# Patient Record
Sex: MALE | Race: WHITE
[De-identification: names, ages, dates, MRNs, and addresses within clinical notes are randomized per-mention and may not be internally consistent; named-entity substitution may affect disease eponyms.]

---

## 2022-12-07 ENCOUNTER — NON-APPOINTMENT (OUTPATIENT)
Age: 72
End: 2022-12-07

## 2022-12-07 PROBLEM — Z00.00 ENCOUNTER FOR PREVENTIVE HEALTH EXAMINATION: Status: ACTIVE | Noted: 2022-12-07

## 2022-12-08 ENCOUNTER — APPOINTMENT (OUTPATIENT)
Dept: UROLOGY | Facility: CLINIC | Age: 72
End: 2022-12-08

## 2022-12-08 VITALS
HEIGHT: 72 IN | BODY MASS INDEX: 24.65 KG/M2 | HEART RATE: 53 BPM | DIASTOLIC BLOOD PRESSURE: 80 MMHG | SYSTOLIC BLOOD PRESSURE: 131 MMHG | TEMPERATURE: 97.6 F | WEIGHT: 182 LBS

## 2022-12-08 PROCEDURE — 99204 OFFICE O/P NEW MOD 45 MIN: CPT

## 2022-12-08 RX ORDER — SILDENAFIL 100 MG/1
100 TABLET, FILM COATED ORAL
Qty: 10 | Refills: 11 | Status: ACTIVE | COMMUNITY
Start: 2022-12-08 | End: 1900-01-01

## 2022-12-08 NOTE — LETTER BODY
[Dear  ___] : Dear  [unfilled], [FreeTextEntry2] : Boy Zambrano MD\par [address]\[place] [address] [FreeTextEntry1] : I had the pleasure of seeing your patient,  BRONSON ANDRADE, a 72 year old man, in the office in consultation today. Please see the attached note for full details.\par \par Thank you very much for allowing me to participate in the care of this patient. If you have any questions please feel free to call me at any time. \par \par \par Sincerely yours,\par \par \par \par Joseph Boston MD, JESICA\par Director, Male Fertility and Microsurgery\par  of Urology\par [facility]\par

## 2022-12-08 NOTE — HISTORY OF PRESENT ILLNESS
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, previous urologist, retired\par treated for 4 years with sildenafil 20mg x1, increased to 40mg recently\par Tried tadalafil in the past, worked but advised to switch to sildenafil for lower effective dose\par In the last 3 months, ED has worsened, difficulty maintaining erections and with penetration \par Erections 4-7/10\par Normal desire\par No previous ICI\par No new medications\par No new identifiable stressors\par No painful erections or penile curvature\par PSA 0.7 7/2022\par \par PMH: spinal cord compression 2015 s/p correction\par PSH: knee surgeries\par Meds: statin (started 2 weeks ago)\par NKDA\par Social: , partially retired, 1-2 glasses wine/day

## 2022-12-08 NOTE — PHYSICAL EXAM
[General Appearance - Well Developed] : well developed [Normal Appearance] : normal appearance [] : no respiratory distress [Exaggerated Use Of Accessory Muscles For Inspiration] : no accessory muscle use [Normal Station and Gait] : the gait and station were normal for the patient's age [Oriented To Time, Place, And Person] : oriented to person, place, and time [Not Anxious] : not anxious [Urethral Meatus] : meatus normal [Penis Abnormality] : normal circumcised penis [FreeTextEntry1] : no penile plaques or TTP

## 2023-06-09 ENCOUNTER — APPOINTMENT (OUTPATIENT)
Dept: UROLOGY | Facility: CLINIC | Age: 73
End: 2023-06-09
Payer: MEDICARE

## 2023-06-09 VITALS
HEIGHT: 72 IN | SYSTOLIC BLOOD PRESSURE: 118 MMHG | TEMPERATURE: 97.6 F | BODY MASS INDEX: 24.65 KG/M2 | WEIGHT: 182 LBS | DIASTOLIC BLOOD PRESSURE: 68 MMHG | HEART RATE: 60 BPM

## 2023-06-09 DIAGNOSIS — N52.9 MALE ERECTILE DYSFUNCTION, UNSPECIFIED: ICD-10-CM

## 2023-06-09 PROCEDURE — 99213 OFFICE O/P EST LOW 20 MIN: CPT

## 2023-06-09 RX ORDER — SILDENAFIL 100 MG/1
100 TABLET, FILM COATED ORAL
Qty: 30 | Refills: 11 | Status: ACTIVE | COMMUNITY
Start: 2023-06-09 | End: 1900-01-01

## 2023-06-09 NOTE — HISTORY OF PRESENT ILLNESS
[FreeTextEntry1] : 73M here for follow up for erectile dysfunction\par generally much happier\par \par - Titrated to sildenafil 100 mg last appt\par - patient endorses good erections on Viagra\par - Difficulty maintaining. \par

## 2023-12-23 NOTE — ASSESSMENT
[FreeTextEntry1] : ED arterial\par otherwise helathy\par trial sildenafil 100\par f/u 6months
f/u w/pediatrician